# Patient Record
Sex: MALE | Race: WHITE
[De-identification: names, ages, dates, MRNs, and addresses within clinical notes are randomized per-mention and may not be internally consistent; named-entity substitution may affect disease eponyms.]

---

## 2018-02-12 ENCOUNTER — HOSPITAL ENCOUNTER (EMERGENCY)
Dept: HOSPITAL 25 - UCEAST | Age: 43
Discharge: HOME | End: 2018-02-12
Payer: COMMERCIAL

## 2018-02-12 VITALS — SYSTOLIC BLOOD PRESSURE: 136 MMHG | DIASTOLIC BLOOD PRESSURE: 72 MMHG

## 2018-02-12 DIAGNOSIS — J10.1: Primary | ICD-10-CM

## 2018-02-12 PROCEDURE — G0463 HOSPITAL OUTPT CLINIC VISIT: HCPCS

## 2018-02-12 PROCEDURE — 87502 INFLUENZA DNA AMP PROBE: CPT

## 2018-02-12 PROCEDURE — 99202 OFFICE O/P NEW SF 15 MIN: CPT

## 2018-02-12 NOTE — UC
FLU HPI





- HPI Summary


HPI Summary: 





Pt presents with sore throat, body aches, fatigue, and fever for 2 days. He 

tells me that his son is positive for flu B and is on tamiflu. He has been 

taking tylenol for his symptoms with mild relief of his discomfort. Denies 

chills, SOB, chest pain, abdominal pain, n/v/d/c.





- History of Current Complaint


Chief Complaint: UCGeneralIllness


Stated Complaint: FLU SYMPTOMS


Time Seen by Provider: 02/12/18 09:05


Hx Obtained From: Patient


Severity Currently: Mild


Severity Initially: Mild


Pain Intensity: 4


Pain Scale Used: 0-10 Numeric





- Allergy/Home Medications


Allergies/Adverse Reactions: 


 Allergies











Allergy/AdvReac Type Severity Reaction Status Date / Time


 


No Known Allergies Allergy   Verified 02/12/18 08:45














PMH/Surg Hx/FS Hx/Imm Hx


Previously Healthy: Yes





- Surgical History


Surgical History: None


Surgery Procedure, Year, and Place: denies





- Family History


Known Family History: Positive: None





- Social History


Occupation: Employed Full-time


Lives: With Family


Alcohol Use: Rare


Substance Use Type: None


Smoking Status (MU): Never Smoked Tobacco





Review of Systems


Constitutional: Fever, Fatigue, Other - Body aches


Skin: Negative


Eyes: Negative


ENT: Sore Throat


Respiratory: Negative


Cardiovascular: Negative


Gastrointestinal: Negative


Neurological: Negative


Psychological: Negative


All Other Systems Reviewed And Are Negative: Yes





Physical Exam


Triage Information Reviewed: Yes


Appearance: Well-Appearing, No Pain Distress, Well-Nourished


Vital Signs: 


 Initial Vital Signs











Temp  98.8 F   02/12/18 08:46


 


Pulse  109   02/12/18 08:46


 


Resp  20   02/12/18 08:46


 


BP  136/72   02/12/18 08:46


 


Pulse Ox  98   02/12/18 08:46











Vital Signs Reviewed: Yes


Eyes: Positive: Conjunctiva Clear.  Negative: Conjunctiva Inflamed, Discharge


ENT: Positive: Hearing grossly normal, Pharynx normal, TMs normal, Uvula 

midline.  Negative: Pharyngeal erythema, Nasal congestion, Nasal drainage, TM 

bulging, TM dull, TM red, Tonsillar swelling, Tonsillar exudate, Hoarse voice, 

Sinus tenderness


Neck: Positive: Supple, Nontender, No Lymphadenopathy


Respiratory: Positive: Lungs clear, Normal breath sounds, No respiratory 

distress, No accessory muscle use


Cardiovascular: Positive: RRR, No Murmur, Pulses Normal


Neurological: Positive: Alert


Psychological: Positive: Age Appropriate Behavior


Skin: Negative: rashes





Flu Course/Dx





- Course


Course Of Treatment: Influenza B positive. Pt requesting tx with tamiflu





- Differential Dx/Diagnosis


Provider Diagnoses: Influenza B





Discharge





- Discharge Plan


Condition: Stable


Disposition: HOME


Prescriptions: 


Oseltamivir CAP* [Tamiflu CAP*] 75 mg PO BID #10 cap


Patient Education Materials:  Influenza (ED)


Referrals: 


No Primary Care Phys,NOPCP [Primary Care Provider] - 


Additional Instructions: 


If you develop a fever, shortness of breath, chest pain, new or worsening 

symptoms - please call your PCP or go to the ED.


 





Your blood pressure was high at todays visit. Please see your primary provider 

within 4 weeks for recheck and re-evaluation.